# Patient Record
Sex: FEMALE | Race: WHITE | NOT HISPANIC OR LATINO | Employment: STUDENT | URBAN - METROPOLITAN AREA
[De-identification: names, ages, dates, MRNs, and addresses within clinical notes are randomized per-mention and may not be internally consistent; named-entity substitution may affect disease eponyms.]

---
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
## 2023-04-25 ENCOUNTER — OFFICE VISIT (OUTPATIENT)
Dept: URGENT CARE | Facility: CLINIC | Age: 18
End: 2023-04-25

## 2023-04-25 DIAGNOSIS — J06.9 VIRAL UPPER RESPIRATORY TRACT INFECTION: Primary | ICD-10-CM

## 2023-04-25 PROBLEM — B85.0 HEAD LICE: Status: RESOLVED | Noted: 2017-01-09 | Resolved: 2023-04-25

## 2023-04-25 RX ORDER — PSEUDOEPHEDRINE HYDROCHLORIDE 60 MG/1
60 TABLET, FILM COATED ORAL EVERY 6 HOURS PRN
Qty: 30 TABLET | Refills: 1 | Status: SHIPPED | OUTPATIENT
Start: 2023-04-25

## 2023-04-25 NOTE — PROGRESS NOTES
[facility]        NAME: Domingo Dunn is a 19 y o  female  : 2005    MRN: [id number]  DATE: 2023  TIME: 3:59 PM      Assessment and Plan     Viral upper respiratory tract infection [J06 9]  1  Viral upper respiratory tract infection  pseudoephedrine (SUDAFED) 60 mg tablet    dextromethorphan-guaifenesin (MUCINEX DM)  MG per 12 hr tablet            Patient Instructions     Patient Instructions     Viruses are worst for the first 3-5 days, mostly better by days 7-10 and all the way better by days 7-14  If you are not feeling improvement by the 7-10 day balbina, or if your symptoms significantly change, you should be seen again  Upper Respiratory Infection   AMBULATORY CARE:   An upper respiratory infection  is also called a cold  Your nose, throat, ears, and sinuses may be affected  You are more likely to get a cold in the winter  Your risk of getting a cold may be increased if you smoke cigarettes or have allergies, such as hay fever  What causes a cold? A cold is caused by a virus  Many viruses can cause a cold, and each is contagious  This means the virus can be easily spread to another person when the sick person coughs or sneezes  The virus can also be spread if you touch an object the virus is on and then touch your eyes, mouth, or nose  Cold symptoms  are usually worst for the first 3 to 5 days  You may have any of the following:  • Runny or stuffy nose    • Sneezing and coughing    • Sore throat or hoarseness    • Red, watery, and sore eyes    • Fatigue (you feel more tired than usual)    • Chills and fever    • Headache, body aches, or sore muscles    Call your local emergency number (911 in the [address] [place] [address],[address]) if:   • You have chest pain or trouble breathing  Seek care immediately if:   • You have a fever over 102ºF (39ºC)  Call your doctor if:   • You have a low fever  • Your sore throat gets worse or you see white or yellow spots in your throat      • Your symptoms get worse after 3 to 5 days or are not better in 14 days  • You have a rash anywhere on your skin  • You have large, tender lumps in your neck  • You have thick, green, or yellow drainage from your nose  • You cough up thick yellow, green, or bloody mucus  • You have a bad earache  • You have questions or concerns about your condition or care  Treatment:  Colds are caused by viruses and do not get better with antibiotics  Most people get better in 7 to 14 days  You may continue to cough for 2 to 3 weeks  The following may help decrease your symptoms:  • Decongestants  help reduce nasal congestion and help you breathe more easily  If you take decongestant pills, they may make you feel restless or not able to sleep  Do not use decongestant sprays for more than a few days  • Cough suppressants  help reduce coughing  Ask your healthcare provider which type of cough medicine is best for you  • NSAIDs , such as ibuprofen, help decrease swelling, pain, and fever  NSAIDs can cause stomach bleeding or kidney problems in certain people  If you take blood thinner medicine, always ask your healthcare provider if NSAIDs are safe for you  Always read the medicine label and follow directions  • Acetaminophen  decreases pain and fever  It is available without a doctor's order  Ask how much to take and how often to take it  Follow directions  Read the labels of all other medicines you are using to see if they also contain acetaminophen, or ask your doctor or pharmacist  Acetaminophen can cause liver damage if not taken correctly  Manage a cold:   • Rest as much as possible  Slowly start to do more each day  • Drink more liquids as directed  Liquids will help thin and loosen mucus so you can cough it up  Liquids will also help prevent dehydration  Liquids that help prevent dehydration include water, fruit juice, and broth  Do not drink liquids that contain caffeine   Caffeine can increase your risk for dehydration  Ask your healthcare provider how much liquid to drink each day  • Soothe a sore throat  Gargle with warm salt water  Make salt water by dissolving ¼ teaspoon salt in 1 cup warm water  You may also suck on hard candy or throat lozenges  You may use a sore throat spray  • Use a humidifier or vaporizer  Use a cool mist humidifier or a vaporizer to increase air moisture in your home  This may make it easier for you to breathe and help decrease your cough  • Use saline nasal drops as directed  These help relieve congestion  • Apply petroleum-based jelly around the outside of your nostrils  This can decrease irritation from blowing your nose  • Do not smoke  Nicotine and other chemicals in cigarettes and cigars can make your symptoms worse  They can also cause infections such as bronchitis or pneumonia  Ask your healthcare provider for information if you currently smoke and need help to quit  E-cigarettes or smokeless tobacco still contain nicotine  Talk to your healthcare provider before you use these products  Prevent a cold:   • Wash your hands often  Use soap and water every time you wash your hands  Rub your soapy hands together, lacing your fingers  Use the fingers of one hand to scrub under the nails of the other hand  Wash for at least 20 seconds  Rinse with warm, running water for several seconds  Then dry your hands  Use hand  gel if soap and water are not available  Do not touch your eyes or mouth without washing your hands first          • Cover a sneeze or cough  Use a tissue that covers your mouth and nose  Put the used tissue in the trash right away  Use the bend of your arm if a tissue is not available  Wash your hands well with soap and water or use a hand   Do not stand close to anyone who is sneezing or coughing  • Try to stay away from others while you are sick    This is especially important during the first 2 to 3 days when the virus is more easily spread  Wait until a fever, cough, or other symptoms are gone before you return to work or other regular activities  • Do not share items while you are sick  This includes food, drinks, eating utensils, and dishes  Follow up with your doctor as directed:  Write down your questions so you remember to ask them during your visits  © Copyright [employer] 2022 Information is for End User's use only and may not be sold, redistributed or otherwise used for commercial purposes  The above information is an  only  It is not intended as medical advice for individual conditions or treatments  Talk to your doctor, nurse or pharmacist before following any medical regimen to see if it is safe and effective for you  Follow up with PCP in 3-5 days  Proceed to  ER if symptoms worsen  Chief Complaint     Chief Complaint   Patient presents with   • Cough     4days         History of Present Illness     Presents accompanied by significant other  Onset of cough and congestion on Sat  No specific sick contacts  Starting to get a little better  No hx asthma or inhaler use  Has taken dayquil and nyquil with some temporary improvement  Stayed home from high school yesterday and today  Review of Systems     Review of Systems   Constitutional: Negative for fever  HENT: Positive for congestion and sore throat (starting to improve)  Respiratory: Positive for cough  All other systems reviewed and are negative          Current Medications       Current Outpatient Medications:   •  dextromethorphan-guaifenesin (MUCINEX DM)  MG per 12 hr tablet, Take 1 tablet by mouth every 12 (twelve) hours as needed for cough (or congestion), Disp: 20 tablet, Rfl: 1  •  etonogestrel (NEXPLANON) subdermal implant, Inject under the skin, Disp: , Rfl:   •  pseudoephedrine (SUDAFED) 60 mg tablet, Take 1 tablet (60 mg total) by mouth every 6 (six) hours as needed for congestion, Disp: 30 tablet, Rfl: 1  •  ondansetron (ZOFRAN) 4 mg tablet, Take 1 tablet (4 mg total) by mouth every 8 (eight) hours as needed for nausea or vomiting for up to 3 days, Disp: 12 tablet, Rfl: 0  •  pantoprazole (PROTONIX) 20 mg tablet, Take 1 tablet (20 mg total) by mouth daily for 14 days Do not start before October 29, 2022 , Disp: 14 tablet, Rfl: 0    Current Allergies     Allergies as of 04/25/2023   • (No Known Allergies)              The following portions of the patient's history were reviewed and updated as appropriate: allergies, current medications, past family history, past medical history, past social history, past surgical history and problem list      History reviewed  No pertinent past medical history  History reviewed  No pertinent surgical history  Family History   Problem Relation Age of Onset   • No Known Problems Mother    • No Known Problems Father          Medications have been verified  Objective     LMP 04/17/2023 (Approximate) Comment: implant  Patient's last menstrual period was 04/17/2023 (approximate)  Physical Exam     Physical Exam  Vitals and nursing note reviewed  Constitutional:       General: She is not in acute distress  Appearance: Normal appearance  She is well-developed  She is ill-appearing (mild)  She is not toxic-appearing or diaphoretic  HENT:      Head: Normocephalic and atraumatic  Right Ear: Tympanic membrane, ear canal and external ear normal       Left Ear: Tympanic membrane, ear canal and external ear normal       Nose: Congestion present  Mouth/Throat:      Mouth: Mucous membranes are moist       Pharynx: No oropharyngeal exudate or posterior oropharyngeal erythema  Eyes:      Pupils: Pupils are equal, round, and reactive to light  Cardiovascular:      Rate and Rhythm: Normal rate and regular rhythm  Heart sounds: Normal heart sounds  No murmur heard  No friction rub  No gallop     Pulmonary:      Effort: Pulmonary effort is normal  No respiratory distress  Breath sounds: Normal breath sounds  Abdominal:      General: There is no distension  Palpations: Abdomen is soft  Musculoskeletal:         General: Normal range of motion  Cervical back: Normal range of motion and neck supple  Skin:     General: Skin is warm and dry  Capillary Refill: Capillary refill takes less than 2 seconds  Neurological:      General: No focal deficit present  Mental Status: She is alert and oriented to person, place, and time  Psychiatric:         Mood and Affect: Mood normal          Behavior: Behavior normal          Thought Content:  Thought content normal          Judgment: Judgment normal

## 2023-04-25 NOTE — LETTER
April 25, 2023     Patient: Yvonne Ellis   YOB: 2005   Date of Visit: 4/25/2023       To Whom it May Concern:    Blue Hernadez was seen in my clinic on 4/25/2023  She may return to school on 4/27  Please excuse from school 4/24-4/26 due to acute illness  If you have any questions or concerns, please don't hesitate to call           Sincerely,          IVETT Bueno        CC: No Recipients

## 2023-04-25 NOTE — PATIENT INSTRUCTIONS
Viruses are worst for the first 3-5 days, mostly better by days 7-10 and all the way better by days 7-14  If you are not feeling improvement by the 7-10 day balbina, or if your symptoms significantly change, you should be seen again  Upper Respiratory Infection   AMBULATORY CARE:   An upper respiratory infection  is also called a cold  Your nose, throat, ears, and sinuses may be affected  You are more likely to get a cold in the winter  Your risk of getting a cold may be increased if you smoke cigarettes or have allergies, such as hay fever  What causes a cold? A cold is caused by a virus  Many viruses can cause a cold, and each is contagious  This means the virus can be easily spread to another person when the sick person coughs or sneezes  The virus can also be spread if you touch an object the virus is on and then touch your eyes, mouth, or nose  Cold symptoms  are usually worst for the first 3 to 5 days  You may have any of the following:  Runny or stuffy nose    Sneezing and coughing    Sore throat or hoarseness    Red, watery, and sore eyes    Fatigue (you feel more tired than usual)    Chills and fever    Headache, body aches, or sore muscles    Call your local emergency number (911 in the [address] [facility],[address]) if:   You have chest pain or trouble breathing  Seek care immediately if:   You have a fever over 102ºF (39ºC)  Call your doctor if:   You have a low fever  Your sore throat gets worse or you see white or yellow spots in your throat  Your symptoms get worse after 3 to 5 days or are not better in 14 days  You have a rash anywhere on your skin  You have large, tender lumps in your neck  You have thick, green, or yellow drainage from your nose  You cough up thick yellow, green, or bloody mucus  You have a bad earache  You have questions or concerns about your condition or care  Treatment:  Colds are caused by viruses and do not get better with antibiotics   Most people get better in 7 to 14 days  You may continue to cough for 2 to 3 weeks  The following may help decrease your symptoms:  Decongestants  help reduce nasal congestion and help you breathe more easily  If you take decongestant pills, they may make you feel restless or not able to sleep  Do not use decongestant sprays for more than a few days  Cough suppressants  help reduce coughing  Ask your healthcare provider which type of cough medicine is best for you  NSAIDs , such as ibuprofen, help decrease swelling, pain, and fever  NSAIDs can cause stomach bleeding or kidney problems in certain people  If you take blood thinner medicine, always ask your healthcare provider if NSAIDs are safe for you  Always read the medicine label and follow directions  Acetaminophen  decreases pain and fever  It is available without a doctor's order  Ask how much to take and how often to take it  Follow directions  Read the labels of all other medicines you are using to see if they also contain acetaminophen, or ask your doctor or pharmacist  Acetaminophen can cause liver damage if not taken correctly  Manage a cold:   Rest as much as possible  Slowly start to do more each day  Drink more liquids as directed  Liquids will help thin and loosen mucus so you can cough it up  Liquids will also help prevent dehydration  Liquids that help prevent dehydration include water, fruit juice, and broth  Do not drink liquids that contain caffeine  Caffeine can increase your risk for dehydration  Ask your healthcare provider how much liquid to drink each day  Soothe a sore throat  Gargle with warm salt water  Make salt water by dissolving ¼ teaspoon salt in 1 cup warm water  You may also suck on hard candy or throat lozenges  You may use a sore throat spray  Use a humidifier or vaporizer  Use a cool mist humidifier or a vaporizer to increase air moisture in your home  This may make it easier for you to breathe and help decrease your cough      Use saline nasal drops as directed  These help relieve congestion  Apply petroleum-based jelly around the outside of your nostrils  This can decrease irritation from blowing your nose  Do not smoke  Nicotine and other chemicals in cigarettes and cigars can make your symptoms worse  They can also cause infections such as bronchitis or pneumonia  Ask your healthcare provider for information if you currently smoke and need help to quit  E-cigarettes or smokeless tobacco still contain nicotine  Talk to your healthcare provider before you use these products  Prevent a cold: Wash your hands often  Use soap and water every time you wash your hands  Rub your soapy hands together, lacing your fingers  Use the fingers of one hand to scrub under the nails of the other hand  Wash for at least 20 seconds  Rinse with warm, running water for several seconds  Then dry your hands  Use hand  gel if soap and water are not available  Do not touch your eyes or mouth without washing your hands first          Cover a sneeze or cough  Use a tissue that covers your mouth and nose  Put the used tissue in the trash right away  Use the bend of your arm if a tissue is not available  Wash your hands well with soap and water or use a hand   Do not stand close to anyone who is sneezing or coughing  Try to stay away from others while you are sick  This is especially important during the first 2 to 3 days when the virus is more easily spread  Wait until a fever, cough, or other symptoms are gone before you return to work or other regular activities  Do not share items while you are sick  This includes food, drinks, eating utensils, and dishes  Follow up with your doctor as directed:  Write down your questions so you remember to ask them during your visits  © Copyright [employer]man 2022 Information is for End User's use only and may not be sold, redistributed or otherwise used for commercial purposes    The above information is an  only  It is not intended as medical advice for individual conditions or treatments  Talk to your doctor, nurse or pharmacist before following any medical regimen to see if it is safe and effective for you

## 2023-04-27 LAB — BACTERIA THROAT CULT: NORMAL
